# Patient Record
Sex: FEMALE | Race: BLACK OR AFRICAN AMERICAN
[De-identification: names, ages, dates, MRNs, and addresses within clinical notes are randomized per-mention and may not be internally consistent; named-entity substitution may affect disease eponyms.]

---

## 2019-01-23 ENCOUNTER — HOSPITAL ENCOUNTER (INPATIENT)
Dept: HOSPITAL 1 - MU | Age: 44
LOS: 1 days | Discharge: HOME | DRG: 747 | End: 2019-01-24
Attending: OBSTETRICS & GYNECOLOGY | Admitting: OBSTETRICS & GYNECOLOGY
Payer: COMMERCIAL

## 2019-01-23 VITALS — DIASTOLIC BLOOD PRESSURE: 74 MMHG | SYSTOLIC BLOOD PRESSURE: 137 MMHG

## 2019-01-23 VITALS — HEIGHT: 61 IN | WEIGHT: 112.5 LBS | BODY MASS INDEX: 21.24 KG/M2

## 2019-01-23 VITALS — DIASTOLIC BLOOD PRESSURE: 82 MMHG | SYSTOLIC BLOOD PRESSURE: 121 MMHG

## 2019-01-23 VITALS — DIASTOLIC BLOOD PRESSURE: 59 MMHG | SYSTOLIC BLOOD PRESSURE: 110 MMHG

## 2019-01-23 VITALS — SYSTOLIC BLOOD PRESSURE: 107 MMHG | DIASTOLIC BLOOD PRESSURE: 61 MMHG

## 2019-01-23 VITALS — DIASTOLIC BLOOD PRESSURE: 51 MMHG | SYSTOLIC BLOOD PRESSURE: 102 MMHG

## 2019-01-23 DIAGNOSIS — N81.11: Primary | ICD-10-CM

## 2019-01-23 DIAGNOSIS — N81.5: ICD-10-CM

## 2019-01-23 DIAGNOSIS — N32.81: ICD-10-CM

## 2019-01-23 DIAGNOSIS — N39.3: ICD-10-CM

## 2019-01-23 DIAGNOSIS — Z90.710: ICD-10-CM

## 2019-01-23 DIAGNOSIS — N81.6: ICD-10-CM

## 2019-01-23 PROCEDURE — 0JQC0ZZ REPAIR PELVIC REGION SUBCUTANEOUS TISSUE AND FASCIA, OPEN APPROACH: ICD-10-PCS | Performed by: OBSTETRICS & GYNECOLOGY

## 2019-01-23 PROCEDURE — 0TVD0ZZ RESTRICTION OF URETHRA, OPEN APPROACH: ICD-10-PCS | Performed by: OBSTETRICS & GYNECOLOGY

## 2019-01-23 PROCEDURE — 0UQF0ZZ REPAIR CUL-DE-SAC, OPEN APPROACH: ICD-10-PCS | Performed by: OBSTETRICS & GYNECOLOGY

## 2019-01-23 PROCEDURE — C1758 CATHETER, URETERAL: HCPCS

## 2019-01-23 NOTE — NUR
PT. AWAKE AND ALERT, SITTING UP IN BED, SIGN. OTHER AT BEDSIDE. PT. ORIENTED
X4, DENIES HEADACHE OR DIZZINESS. BREATH SOUNDS CLEAR THROUGHOUT LUNG LAGUNAS,
RESP. EVEN, UNLABORED. NO SOB NOTED. ABD. SOFT AND FLAT, BOWEL SOUNDS ACTIVE.
DENIES NAUSEA. ABD. PAINLEVEL 2/10 PER PT. DENIES NEED FOR PAIN MEDICATION. NO
EDEMA TO BLE.PEDAL PULSES STRONG. IVF LR AT 125CC/HR. CALL LIGHT WITHIN REACH.

## 2019-01-23 NOTE — NUR
PROVIDED HYGIENE CARE. BED LINEN CHANGED. ASSISTED PT OUT OF BED AND BACK TO
BED. STEADY GAIT NOTED, PT DENIES NAUSEA, VOMITING. PT REPORTS PAIN IS 2/10 TO
SURGICAL SITE, TOLERABLE AT THAT LEVEL PER PT. PAD REPLACED MILD BLOOD TINGED
ON PAD WITH NO ACTIVE BLEEDING AT MOMENT. EFFORTLESS BREATHING ON ROOM AIR. PT
PROVIDED WITH CLEAR LIQUID SNACK PER REQUEST. CALL LIGHT WITHIN REACH, BED IN
LOWEST POSITION. WILL CONTINUE TO MONITOR.

## 2019-01-23 NOTE — NUR
PT. W/ EYES CLOSED, APPEARS TO BE SLEEPING AND COMFORTABLE. IVF INFUSING WELL.
CALL LIGHT REMAINS WITHIN REACH.

## 2019-01-23 NOTE — NUR
PT IN FOWLERS, DENIES PAIN, REPORTS COMFORT AT MOMENT, EFFORTLESS BREATHING ON
ROOM AIR. NO ACTIVE BLEEDING AT SURGICAL SITE. IV INFUSING WELL TO LAC#22. BED
IN LOWEST POSITION, CALL LIGHT WITHIN REACH.  AT BEDSIDE.

## 2019-01-23 NOTE — NUR
RECEIVED PT FROM OR STAFF. PT AWAKE, ALERT, ORIENTED, FAMILY AT BEDSIDE. TELE
MONITOR#1 SINUS RHYTHM ON MONITOR. DENIES CHEST PAIN, PT C/O PAIN TO VAGINAL
AREA (SURGICAL SITE) MEDICATED PER EMAR, PT TOLERATED MEDICATION WELL. S1S2 ON
AUSCULTATION, EFFORTLESS BREATHING ON ROOM AIR. 98% PALPABLE PULSES +2 TO ALL
EXTREMITIES, SCDS IN PLACE. BOWEL SOUNDS ACTIVE TO ALL QUADRANTS, ABD SOFT TO
PALPATION. PT REPORTS VOIDING WELL. AMBULATORY AT BASELINE, SKIN INTACT.
ORIENTED PT TO ROOM ENVIRONMENT, CALL LIGHT WITHIN REACH. BED IN LOWEST
POSITION. WILL CONTINUE TO MONITOR.

## 2019-01-24 VITALS — SYSTOLIC BLOOD PRESSURE: 101 MMHG | DIASTOLIC BLOOD PRESSURE: 54 MMHG

## 2019-01-24 VITALS — SYSTOLIC BLOOD PRESSURE: 111 MMHG | DIASTOLIC BLOOD PRESSURE: 57 MMHG

## 2019-01-24 VITALS — DIASTOLIC BLOOD PRESSURE: 54 MMHG | SYSTOLIC BLOOD PRESSURE: 101 MMHG

## 2019-01-24 LAB
BASOPHILS NFR BLD: 0.3 % (ref 0–2)
ERYTHROCYTE [DISTWIDTH] IN BLOOD BY AUTOMATED COUNT: 14 % (ref 11.5–14.5)
PLATELET # BLD: 127 X10^3MCL (ref 130–400)

## 2019-01-24 NOTE — NUR
PT PROVIDED WITH DC HOME INSTUCTIONS. PT PROVIDED WITH AFTER CARE FOR POST
SURGERY. PT INFORMED TO TAKE MEDIACTIONS AS OREDERED BY MD. PT INFORMED THAT
IF WORSENING S/S WERE TO OCCUR TO RETURN TO ED OR REPORT TO PCP. PT AND FAMILY
VERBALIZED UNDERSTANDING OF INSTRUCTIONS. TELE AND IV DC'D, CATHETER INTACT.
NO REDNESS/INFLAMMATION/DISCOMFORT NOTED. PT TAKEN DOWN TO LOBBY WITH ALL
PERSONAL BELONGINGS IN HAND FREE OF ANY APPARENT SIGNS OF DISTRESS WITH RN
PRESENT AT BEDSIDE.

## 2019-01-24 NOTE — NUR
PT. AWAKE, C/O SLIGHT DISCOMFORT IN LOWER ABD. REGION. STATED THAT IT WAS NOT
TOO MUCH LIKE PAIN, BUT JUST UNCOMFORTABLE. PRN MOTRIN GIVEN. WILL CONTINUE TO
MONITOR. CALL LIGHT WITHIN REACH.

## 2019-01-24 NOTE — NUR
AM MEDICATION GIVEN. PT TOLERATED WELL. RESPIRATIONS EQUAL AND UNLABORED. ON
RA, DENIES SOB. PT DENIES PAIN AT THIS TIME. BED IN LOW POSITION. CALL LIGHT
IN REACH. WILL CONTINUE TO MONITOR

## 2019-01-24 NOTE — NUR
RC'D PT RESTING IN BED WITH NO APPARENT SIGNS OF DISTRESS. A/A/O/X4, SPEECH
CLEAR AND APPROPRIATE. DENIES HA/DIZZINESS. ON TELE, DENIES CHEST
PAIN/PRESSURE. PALP PULSES, NO EDEMA NOTED. RESPIRATIONS EQUAL AND UNLABORED.
LUNGS CTA. ON RA, DENIES SOB. ABDOMEN SOFT AND NONTENDER AT THIS TIME. ACTIVE
BS. DENIES N/V. VOIDS FREELY. SCANT DARK RED DRAINAGE NOTED UPON URINATION.
AMBULATORY. SKIN W/D/I. DENIES PAIN AT THIST LCARE. IV PATENT AND INTACT. BED IN
LOW POSITION. CALL LIGHT IN REACH. WILL CONTINUE TO MONITOR

## 2019-01-24 NOTE — NUR
PT RESTING IN BED WITH FAMILY PRESENT. PT ANXIOUS TO DC. NO ORDERS AT THIS
TIME. WILL CALL DR TO F/U ON ANY DC ORDERS

## 2019-01-24 NOTE — NUR
PT. HAD MINIMAL BLEEDING THROUGHOUT THE NIGHT. PERIPAD W/ SCANT AMOUNT OF
BLOOD NOTED. NO CLOTS. PT. DID HAVE BLEEDING, WHICH APPEARS TO BE DARKER RED
BLOOD, NOT JOHANA BLEEDING, IN SCANT AMOUNT, WHENEVER SHE VOIDED. DENIES NEED
FOR PAIN MEDICATION AT THIS TIME. DOZING OFF AND ON IN SLEEP. IVF INFUSING
WELL, SITE REMAINS INTACT. CALL LIGHT WITHIN REACH. WILL ENDORSE PT. CARE TO
INCOMING NURSE.

## 2019-01-24 NOTE — NUR
DR BHAKTA CALLED AND NOTIFIEDOF PTS CURRENT STATUS. PER DR BHAKTA PT OKAY TO DC AT
THIS TIME. RC'D VERBAL ORDERS TO DC HOME AT THIS TIME. PT NOTIFIED AND MADE
AWARE TO MAKE A F/U APPT WITH DR BHAKTA. PT VERBALIZED UNDERSTANDING OF
INSTRUCTIONS.